# Patient Record
(demographics unavailable — no encounter records)

---

## 2025-03-01 NOTE — HISTORY OF PRESENT ILLNESS
[FreeTextEntry1] : Here for BP check [de-identified] : Here for BP check Brings in home BP cuff. Has been getting elevated manual readings-has had school nurse check BP and the readings have been higher than what her cuff is reading.  137/85-patient machine here in office. Tonight will be 3rd night of 10mg amlodipine   Had some headache this week.  Felt slightly winded at times at work. Was away last week and vomitted 1x last Friday; no other GI or URI symptoms since.   Taking Vitamin D  Zyrtec    Stationary bike  Some weight lifting earlier this week. Felt some rib pain right side yesterday which has since resolved.  Denies any indigestion.  Menstrual cycle usually 3-4 days; experiencing headaches during cycle.  Due for cycle soon.

## 2025-03-01 NOTE — PLAN
[FreeTextEntry1] : HTN-repeat manual 124/84; home cuff giving elevated reading in office, but lower reading during the week when checking with school nurse.  Patient will get new BP cuff.  Was having elevated SBP and DBP during week.  Continue amlodipine 10mg; BP in good range today; side effects discussed.  Has upcoming Cardio appt.  Stable exam in office.   Sinus pressure-start saline nasal spray. Due for menstrual cycle.  Discussed with Ms. GODINEZ precautions and advised to go to seek immediate medical re-evaluation if condition worsened.  Ms. CORIN GODINEZ expressed understanding of the plan.

## 2025-03-01 NOTE — PHYSICAL EXAM
[No Acute Distress] : no acute distress [Normal Oropharynx] : the oropharynx was normal [Normal TMs] : both tympanic membranes were normal [No Lymphadenopathy] : no lymphadenopathy [Normal] : affect was normal and insight and judgment were intact [No Edema] : there was no peripheral edema [No Extremity Clubbing/Cyanosis] : no extremity clubbing/cyanosis [de-identified] : Frontal sinus tenderness to palpation [de-identified] : no reproducible tenderness to palpation

## 2025-03-01 NOTE — REVIEW OF SYSTEMS
[Fever] : no fever [Chills] : no chills [Nasal Discharge] : no nasal discharge [Sore Throat] : no sore throat [Palpitations] : no palpitations [Shortness Of Breath] : no shortness of breath [Cough] : no cough [Nausea] : no nausea [Constipation] : no constipation [Diarrhea] : diarrhea [Vomiting] : no vomiting [Dysuria] : no dysuria [Headache] : no headache [Dizziness] : no dizziness [FreeTextEntry5] : right sided rib pain [FreeTextEntry6] : Felt winded in afternoon; not whole week  [FreeTextEntry7] : got nauseaous last friday. when away 1 episode of vomitting  [de-identified] : headaches on and off since monday-no light or sound sensitivity  [de-identified] : Stress level has been okay

## 2025-03-20 NOTE — HEALTH RISK ASSESSMENT
[Good] : ~his/her~  mood as  good [Yes] : Yes [2 - 4 times a month (2 pts)] : 2-4 times a month (2 points) [1 or 2 (0 pts)] : 1 or 2 (0 points) [Never (0 pts)] : Never (0 points) [No] : In the past 12 months have you used drugs other than those required for medical reasons? No [No falls in past year] : Patient reported no falls in the past year [0] : 2) Feeling down, depressed, or hopeless: Not at all (0) [Never] : Never [0-4] : 0-4 [Patient reported PAP Smear was normal] : Patient reported PAP Smear was normal [HIV test declined] : HIV test declined [Hepatitis C test declined] : Hepatitis C test declined [None] : None [With Significant Other] : lives with significant other [College] : College [] :  [# Of Children ___] : has [unfilled] children [Sexually Active] : sexually active [Feels Safe at Home] : Feels safe at home [Fully functional (bathing, dressing, toileting, transferring, walking, feeding)] : Fully functional (bathing, dressing, toileting, transferring, walking, feeding) [Fully functional (using the telephone, shopping, preparing meals, housekeeping, doing laundry, using] : Fully functional and needs no help or supervision to perform IADLs (using the telephone, shopping, preparing meals, housekeeping, doing laundry, using transportation, managing medications and managing finances) [Smoke Detector] : smoke detector [Carbon Monoxide Detector] : carbon monoxide detector [Safety elements used in home] : safety elements used in home [Audit-CScore] : 3 [de-identified] : working [de-identified] : reg [Change in mental status noted] : No change in mental status noted [Language] : denies difficulty with language [Behavior] : denies difficulty with behavior [Learning/Retaining New Information] : denies difficulty learning/retaining new information [Handling Complex Tasks] : denies difficulty handling complex tasks [Reasoning] : denies difficulty with reasoning [Spatial Ability and Orientation] : denies difficulty with spatial ability and orientation [Reports changes in hearing] : Reports no changes in hearing [Reports changes in vision] : Reports no changes in vision [Reports normal functional visual acuity (ie: able to read med bottle)] : Reports poor functional visual acuity.  [Reports changes in dental health] : Reports no changes in dental health [PapSmearDate] : 2023

## 2025-03-20 NOTE — HISTORY OF PRESENT ILLNESS
[FreeTextEntry1] : annual wellness exam [de-identified] : 03/19/25: Pt here for annual wellness exam. - Went to ER beginning of February due to HTN crisis, was discharged with 5 mg of amlodipine. Pt saw Dr. Jean-Baptiste for follow up, amlodipine was increased to 10 mg. BP has been controlled since then, reports bp in 120s systolic. Seeing cardiologist Dr. Nunez end of this month. --Considering stopping oral birth control since  getting vasectomy.  -Stopped pramipexole.  -Considering tapering off Lexapro, mood has been stable. Pt states she has been on it for 10 years  3/14/24: 5-year-old in  with mom (a teacher in  center).    2 kids, is teacher in [employer]. doing well on lexapro; sees therapist weekly

## 2025-07-25 NOTE — HISTORY OF PRESENT ILLNESS
[FreeTextEntry1] : follow up and rx refill  [de-identified] : Jul 23 2025  4:40PM Ms. CORIN GODINEZ is a 39-year-old female with HTN, obesity, anxiety, depression, restless leg syndrome presents today for follow up and rx refill  Currently on Zepbound 7.5 mg. Her current BMI is 23.8.  Reports trying to eat healthy and she is being active with routine exercise.

## 2025-07-25 NOTE — PHYSICAL EXAM
[No Acute Distress] : no acute distress [Well-Appearing] : well-appearing [No Respiratory Distress] : no respiratory distress  [No Accessory Muscle Use] : no accessory muscle use [Clear to Auscultation] : lungs were clear to auscultation bilaterally [Normal Rate] : normal rate  [Regular Rhythm] : with a regular rhythm [Normal S1, S2] : normal S1 and S2 [No Murmur] : no murmur heard [No Edema] : there was no peripheral edema

## 2025-07-25 NOTE — HISTORY OF PRESENT ILLNESS
[FreeTextEntry1] : follow up and rx refill  [de-identified] : Jul 23 2025  4:40PM Ms. CORIN GODINEZ is a 39-year-old female with HTN, obesity, anxiety, depression, restless leg syndrome presents today for follow up and rx refill  Currently on Zepbound 7.5 mg. Her current BMI is 23.8.  Reports trying to eat healthy and she is being active with routine exercise.

## 2025-07-25 NOTE — ASSESSMENT
[FreeTextEntry1] : # Obesity  Counseled  BMI 23.8  Discussed weaning off  Zepbound  will decrease Zepbound to 5 mg weekly  # Anxiety/Depression  NO SI/HI  Continue Lexapro 20 mg daily # HTN  BP stable  Continue Amlodipine 5 mg daily #